# Patient Record
(demographics unavailable — no encounter records)

---

## 2019-02-04 NOTE — RADIOLOGY REPORT (SQ)
EXAM DESCRIPTION:  MRI HEAD WITHOUT



COMPLETED DATE/TIME:  2/4/2019 1:00 pm



REASON FOR STUDY:  MIGRAINES (G43.009) G43.009  MIGRAINE W/O AURA, NOT INTRACTABLE, W/O STATUS MIGRA



COMPARISON:  None.



TECHNIQUE:  Multiplanar imaging includes non-contrasted T1, T2, FLAIR, and Diffusion with ADC map seq
uences. Images stored on PACS.



LIMITATIONS:  None.



FINDINGS:  ANATOMY: No anomalies. Normal vascular flow voids. Pituitary fossa normal.

CSF SPACES: Normal in size and contour. No hemorrhage.

CEREBRUM: A few high-signal intensity lesions scattered throughout the white matter on FLAIR imaging 
with distribution suggesting chronic micro-vascular ischemic change.  Sulci and gyri normal in size a
nd contour.  No evidence of hemorrhage, mass or extraaxial fluid collection.

POSTERIOR FOSSA: No signal alteration. No hemorrhage. No edema, masses or mass effect.  Internal emilie
tory canals, cerebello-pontine angles, mastoids normal.

DIFFUSION: Negative for acute or sub-acute infarction.

ORBITS: No masses.  Globes normal.

PARANASAL SINUSES: Marked chronic ethmoid and sphenoid sinus disease.

OTHER: No other significant finding.



IMPRESSION:  MINIMAL MICROVASCULAR ISCHEMIC CHANGE.

Marked chronic ethmoid and sphenoid sinus disease.

EVIDENCE OF ACUTE STROKE: NO.



TECHNICAL DOCUMENTATION:  JOB ID:  [id number]

 2011 [employer]- All Rights Reserved



Reading location - IP/workstation name: [email or web address]

## 2020-05-20 NOTE — RADIOLOGY REPORT (SQ)
EXAM DESCRIPTION:  SHOULDER LEFT 2 OR MORE VIEWS



IMAGES COMPLETED DATE/TIME:  5/20/2020 1:21 pm



REASON FOR STUDY:  OTHER SHOULDER LESIONS, LEFT SHOULDER M75.82  OTHER SHOULDER LESIONS, LEFT SHOULDE
R



COMPARISON:  None.



NUMBER OF VIEWS:  Three views.



TECHNIQUE:  Internal rotation, external rotation, and Y view images acquired of the left shoulder.



LIMITATIONS:  None.



FINDINGS:  MINERALIZATION: Normal.

BONES: No acute fracture.  No worrisome bone lesions.

JOINTS: No dislocation.

VISUALIZED LUNGS AND RIBS: No pneumothorax.  No rib fracture.

SOFT TISSUES: No radiopaque foreign body.

OTHER: No other significant finding.



IMPRESSION:  NEGATIVE STUDY OF THE LEFT SHOULDER. NO RADIOGRAPHIC EVIDENCE OF ACUTE INJURY.



TECHNICAL DOCUMENTATION:  JOB ID:  [id number]

 2011 [employer]- All Rights Reserved



Reading location - IP/workstation name: [email or web address]